# Patient Record
Sex: FEMALE | Race: NATIVE HAWAIIAN OR OTHER PACIFIC ISLANDER
[De-identification: names, ages, dates, MRNs, and addresses within clinical notes are randomized per-mention and may not be internally consistent; named-entity substitution may affect disease eponyms.]

---

## 2019-04-26 ENCOUNTER — HOSPITAL ENCOUNTER (OUTPATIENT)
Dept: HOSPITAL 42 - SDS | Age: 54
Discharge: HOME | End: 2019-04-26
Attending: RADIOLOGY
Payer: MEDICAID

## 2019-04-26 VITALS
RESPIRATION RATE: 20 BRPM | DIASTOLIC BLOOD PRESSURE: 74 MMHG | HEART RATE: 64 BPM | SYSTOLIC BLOOD PRESSURE: 122 MMHG | TEMPERATURE: 97 F | OXYGEN SATURATION: 98 %

## 2019-04-26 VITALS — BODY MASS INDEX: 27.5 KG/M2

## 2019-04-26 DIAGNOSIS — I10: ICD-10-CM

## 2019-04-26 DIAGNOSIS — E04.1: Primary | ICD-10-CM

## 2019-04-26 LAB
APTT BLD: 38.9 SECONDS (ref 26.9–38.3)
BASOPHILS # BLD AUTO: 0.03 K/MM3 (ref 0–2)
BASOPHILS NFR BLD: 0.4 % (ref 0–3)
BUN SERPL-MCNC: 19 MG/DL (ref 7–21)
CALCIUM SERPL-MCNC: 9.3 MG/DL (ref 8.4–10.5)
EOSINOPHIL # BLD: 0.2 10*3/UL (ref 0–0.7)
EOSINOPHIL NFR BLD: 2.2 % (ref 1.5–5)
ERYTHROCYTE [DISTWIDTH] IN BLOOD BY AUTOMATED COUNT: 12.9 % (ref 11.5–14.5)
GFR NON-AFRICAN AMERICAN: > 60
HGB BLD-MCNC: 13.8 G/DL (ref 12–16)
INR PPP: 1.19
LYMPHOCYTES # BLD: 2.1 10*3/UL (ref 1.2–3.4)
LYMPHOCYTES NFR BLD AUTO: 27.1 % (ref 22–35)
MCH RBC QN AUTO: 28.8 PG (ref 25–35)
MCHC RBC AUTO-ENTMCNC: 32.2 G/DL (ref 31–37)
MCV RBC AUTO: 89.4 FL (ref 80–105)
MONOCYTES # BLD AUTO: 0.6 10*3/UL (ref 0.1–0.6)
MONOCYTES NFR BLD: 7.1 % (ref 1–6)
PLATELET # BLD: 280 10^3/UL (ref 120–450)
PMV BLD AUTO: 10.5 FL (ref 7–11)
PROTHROMBIN TIME: 13.5 SECONDS (ref 9.4–12.5)
RBC # BLD AUTO: 4.79 10^6/UL (ref 3.5–6.1)
WBC # BLD AUTO: 7.9 10^3/UL (ref 4.5–11)

## 2019-04-26 PROCEDURE — 88173 CYTOPATH EVAL FNA REPORT: CPT

## 2019-04-26 PROCEDURE — 85025 COMPLETE CBC W/AUTO DIFF WBC: CPT

## 2019-04-26 PROCEDURE — 85730 THROMBOPLASTIN TIME PARTIAL: CPT

## 2019-04-26 PROCEDURE — 99152 MOD SED SAME PHYS/QHP 5/>YRS: CPT

## 2019-04-26 PROCEDURE — 88305 TISSUE EXAM BY PATHOLOGIST: CPT

## 2019-04-26 PROCEDURE — 36415 COLL VENOUS BLD VENIPUNCTURE: CPT

## 2019-04-26 PROCEDURE — 85610 PROTHROMBIN TIME: CPT

## 2019-04-26 PROCEDURE — 80048 BASIC METABOLIC PNL TOTAL CA: CPT

## 2019-04-26 PROCEDURE — 10005 FNA BX W/US GDN 1ST LES: CPT

## 2019-04-26 NOTE — US
PROCEDURE:  Ultrasound-guided right thyroid fine needle aspiration 

biopsy.



CLINICAL HISTORY:  12 mm hypoechoic nodule in the right thyroid with 

calcification.  Evaluate for malignancy 



PHYSICIAN(S):  Matthew Obando M.D.



TECHNIQUE:

The relative risks and indications for the procedure were explained 

to the patient and consent obtained. The patient was placed supine on 

the stretcher with the neck extended and preliminary sonography of 

the thyroid performed. This reveal a 12 mm hypoechoic nodule with 

microCa++.  



The neck was prepped and draped in the usual sterile fashion. 

Conscious sedation and monitoring were provided throughout the 

procedure by a nurse. 1% Xylocaine was used to anesthetize the skin 

and soft tissues at the access site. Three passes with a 22-gauge 

needle were performed under ultrasound guidance for fine needle 

aspiration of the 12 mm hypoechoic nodule  in the right thyroid. The 

slides were reviewed by pathology and deemed adequate. The patient 

tolerated the procedure well.



IMPRESSION:

1. Ultrasound guided fine needle aspiration of a 12 mm hypoechoic 

nodule with calcification in the right thyroid